# Patient Record
(demographics unavailable — no encounter records)

---

## 2024-10-09 NOTE — HISTORY OF PRESENT ILLNESS
[FreeTextEntry1] : SLOAN TRAN is a 34 year old M who presents for initial consultation for reconstructive options after planned pilonidal cystectomy.  Patient reports noting a palpable lump to his sacrum x 6 months, intermittent swelling, tender to palpation, with recent spontaneous drainage at the lump site. He endorses discomfort in the sitting position, applied Savlon antiseptic cream when the cyst ruptures. He has a hx of perirectal abscess as a child however has not had recurrence since his childhood.  He weas evaluated by Dr. Ugalde at [facility] ([facility] in July 2024, at which time he was diagnosed with pilonidal cyst and recommended surgical excision, however due to a long wait for surgery, deferred and saw Dr. Hung for a second opinion.  Of note, MRI pelvis (6/29/24) notable for: 1: A blind ending curvilinear tract in the right gluteal cleft adjacent to the sacral tip may represent a pilonidal cyst tract associated with 1.2 x 0.5cm phlegmnous change. 2.) Focal region of induration/inflammation more inferiorly in the right medial gluteal cleft approx 2.8x 0.9cm in extent in close proximity with the anal verge w/o definitive fistulous tract 3.) Possible blind ending sinus tract superiorly arising at 6:00 distal anus 4.) Additional patchy regions od skin thickening and enhancement along the gluteal cleft bilaterally left greater than right. 5.) Isolated 1.0 cm rim enhancing subcutaneous cyst in the left gluteal cleft, possible sebaceous cyst with debris.  At this time, denies cp, sob, subjective fever, chills, headache, cough, myalgia, malaise, abd pain, n/v/d, decreased appetite, and generalized weakness.   PMHx: anxiety/depression PSHx: hernia repair Family hx: father +melanoma Allergies: NKDA Current meds: bupropion, nortriptyline, Descovy (PrEP), MVI, Creatin,  Social hx:

## 2024-10-09 NOTE — CONSULT LETTER
[Dear  ___] : Dear  [unfilled], [Consult Letter:] : I had the pleasure of evaluating your patient, [unfilled]. [Please see my note below.] : Please see my note below. [Consult Closing:] : Thank you very much for allowing me to participate in the care of this patient.  If you have any questions, please do not hesitate to contact me. [Sincerely,] : Sincerely, [FreeTextEntry3] : Myron Sanabria MD

## 2024-10-09 NOTE — HISTORY OF PRESENT ILLNESS
[FreeTextEntry1] : SLOAN TRAN is a 34 year old M who presents for initial consultation for reconstructive options after planned pilonidal cystectomy.  Patient reports noting a palpable lump to his sacrum x 6 months, intermittent swelling, tender to palpation, with recent spontaneous drainage at the lump site. He endorses discomfort in the sitting position, applied Savlon antiseptic cream when the cyst ruptures. He has a hx of perirectal abscess as a child however has not had recurrence since his childhood.  He weas evaluated by Dr. Ugalde at [facility] ([facility] in July 2024, at which time he was diagnosed with pilonidal cyst and recommended surgical excision, however due to a long wait for surgery, deferred and saw Dr. Hung for a second opinion.  Of note, MRI pelvis (6/29/24) notable for: 1: A blind ending curvilinear tract in the right gluteal cleft adjacent to the sacral tip may represent a pilonidal cyst tract associated with 1.2 x 0.5cm phlegmnous change. 2.) Focal region of induration/inflammation more inferiorly in the right medial gluteal cleft approx 2.8x 0.9cm in extent in close proximity with the anal verge w/o definitive fistulous tract 3.) Possible blind ending sinus tract superiorly arising at 6:00 distal anus 4.) Additional patchy regions od skin thickening and enhancement along the gluteal cleft bilaterally left greater than right. 5.) Isolated 1.0 cm rim enhancing subcutaneous cyst in the left gluteal cleft, possible sebaceous cyst with debris.  At this time, denies cp, sob, subjective fever, chills, headache, cough, myalgia, malaise, abd pain, n/v/d, decreased appetite, and generalized weakness.   PMHx: anxiety/depression PSHx: hernia repair Family hx: father +melanoma Allergies: NKDA Current meds: bupropion, nortriptyline, Descovy (PrEP), MVI, Creatin,  Social hx:

## 2024-10-09 NOTE — PHYSICAL EXAM
[TextEntry] : Sacrococcygeal: hirsute, palpable mass to the left buttock,  +pits along the midline, slightly tender to palpation,  no erythema, no gross signs of infection  Constitutional: NAD, well-nourished HENT: Normocephalic, atraumatic, PERRL, non-icteric sclerae, neck supple, trachea midline PULM: LSCTAB, no wheezing or rhonchi CV: RRR, no murmur, rubs, or gallops Abd: Soft, NT, ND, +BS, no palpable masses or bulge MSK: RITCHIE, 5/5 strength to all extremities Skin: No rash noted Neuro: A&O x 3, no FND Psych: Mood is appropriate

## 2024-10-09 NOTE — DISCUSSION/SUMMARY
[TextEntry] : SLOAN TRAN is a 34 year old M who presents for initial consultation for reconstructive options after planned pilonidal cystectomy.  At this time, recommended reconstruction of the pilonidal area following excision as there will be a full thickness defect of the involved area.   It was discussed with the patient that the reconstructive options will be based on the defect size and surrounding tissue laxity of the involved area. Patient was counseled that the area involved is a high tension area that may affect wound healing. We discussed the surgical reconstruction which involves a local tissue rearrangement, such as the Limberg or the Dufourmentel flap. We also discussed the necessity of placement of a drain. The patient was explained the potential complications and risks of the surgery in full detail. Risks following layered primary closure or local tissue rearrangement includes but are not limited to wound dehiscence, delayed wound healing, contour irregularities, bleeding, bruising, infection, fat necrosis, recurrence, numbness or paraesthesias. All questions were answered; the patient fully understands the risks and plan and would like to proceed as discussed today.   - Plan for reconstruction of pilonidal wound, possible local flap - Our office will coordinate with Dr. Hung - Surgical paperwork submitted

## 2025-01-21 NOTE — ASSESSMENT
[FreeTextEntry1] : #Acne vulgaris - buttocks also with hx of #Pilonoidal cyst likely #Hidradenitis suppurativa -chronic, flaring -I have discussed the chronic nature and course of this condition no other active areas besides buttocks reviewed tx options including topical and oral abx, biologic if refractory pt opts for topicals at this time start hibiclens wash daily -start clindamycin 1% lotion bid to affected areas prn flare RTC PRN flare for oral abx or ILK  #Callus no e/o verruca vulgaris on exam  RTC PRN

## 2025-01-21 NOTE — HISTORY OF PRESENT ILLNESS
[FreeTextEntry1] : Buttock acne, hyperpigmentation [de-identified] : Elissa Kim 33 y/o M presents for buttock acne and acne scars + warts on hands and feet using benzoyl peroxide, helps prevent acne but does not go away no prior tx for warts on R hand   Personal history of skin cancer: no Family history of skin cancer: father - melanoma History of blistering sunburns: yes History of tanning bed use: no Uses sunscreen regularly: no Medication allergies: NKDA

## 2025-01-21 NOTE — PHYSICAL EXAM
[Alert] : alert [Oriented x 3] : ~L oriented x 3 [Declined] : declined [FreeTextEntry3] : Focused exam: -erythematous papules and hyperpigmented papules on the buttocks